# Patient Record
Sex: FEMALE | Race: WHITE | NOT HISPANIC OR LATINO | URBAN - METROPOLITAN AREA
[De-identification: names, ages, dates, MRNs, and addresses within clinical notes are randomized per-mention and may not be internally consistent; named-entity substitution may affect disease eponyms.]

---

## 2023-11-14 ENCOUNTER — IMAGING SERVICES (OUTPATIENT)
Dept: GENERAL RADIOLOGY | Age: 38
End: 2023-11-14
Attending: FAMILY MEDICINE

## 2023-11-14 ENCOUNTER — WALK IN (OUTPATIENT)
Dept: URGENT CARE | Age: 38
End: 2023-11-14

## 2023-11-14 VITALS
TEMPERATURE: 98 F | DIASTOLIC BLOOD PRESSURE: 72 MMHG | HEART RATE: 63 BPM | SYSTOLIC BLOOD PRESSURE: 104 MMHG | OXYGEN SATURATION: 99 % | RESPIRATION RATE: 16 BRPM

## 2023-11-14 DIAGNOSIS — R07.81 RIB PAIN ON RIGHT SIDE: ICD-10-CM

## 2023-11-14 DIAGNOSIS — R07.81 RIB PAIN ON RIGHT SIDE: Primary | ICD-10-CM

## 2023-11-14 PROCEDURE — 71101 X-RAY EXAM UNILAT RIBS/CHEST: CPT | Performed by: RADIOLOGY

## 2023-11-14 PROCEDURE — 99204 OFFICE O/P NEW MOD 45 MIN: CPT | Performed by: FAMILY MEDICINE

## 2023-11-14 RX ORDER — ACETAMINOPHEN AND CODEINE PHOSPHATE 300; 30 MG/1; MG/1
TABLET ORAL
Qty: 15 TABLET | Refills: 0 | Status: SHIPPED | OUTPATIENT
Start: 2023-11-14 | End: 2023-11-17

## 2024-04-21 ENCOUNTER — APPOINTMENT (OUTPATIENT)
Dept: GENERAL RADIOLOGY | Age: 39
End: 2024-04-21
Attending: NURSE PRACTITIONER
Payer: COMMERCIAL

## 2024-04-21 ENCOUNTER — HOSPITAL ENCOUNTER (OUTPATIENT)
Age: 39
Discharge: HOME | End: 2024-04-21
Payer: COMMERCIAL

## 2024-04-21 VITALS
HEART RATE: 76 BPM | RESPIRATION RATE: 18 BRPM | DIASTOLIC BLOOD PRESSURE: 84 MMHG | OXYGEN SATURATION: 100 % | TEMPERATURE: 97 F | SYSTOLIC BLOOD PRESSURE: 137 MMHG

## 2024-04-21 DIAGNOSIS — R07.89 CHEST WALL PAIN: ICD-10-CM

## 2024-04-21 DIAGNOSIS — R07.89 CHEST PAIN, ATYPICAL: ICD-10-CM

## 2024-04-21 DIAGNOSIS — M94.0 COSTOCHONDRITIS: Primary | ICD-10-CM

## 2024-04-21 PROCEDURE — 71046 X-RAY EXAM CHEST 2 VIEWS: CPT | Performed by: NURSE PRACTITIONER

## 2024-04-21 PROCEDURE — 99204 OFFICE O/P NEW MOD 45 MIN: CPT | Performed by: NURSE PRACTITIONER

## 2024-04-21 RX ORDER — NAPROXEN 500 MG/1
500 TABLET ORAL 2 TIMES DAILY PRN
Qty: 14 TABLET | Refills: 0 | Status: SHIPPED | OUTPATIENT
Start: 2024-04-21 | End: 2024-04-28

## 2024-04-21 NOTE — DISCHARGE INSTRUCTIONS
Increase water intake 2-3 liters daily   Performed 10 breaths while awake with incentive spirometer.  If you develop chest pain or shortness of breath please go to the emergency room.

## 2024-04-21 NOTE — ED PROVIDER NOTES
Patient Seen in: [facility]      History     Chief Complaint   Patient presents with    Pain    Chest Pain Angina     Stated Complaint: sharp pain when takes breath    Subjective:   HPI    39-year-old female presents today with complaints of anterior chest wall pain since Tuesday.  Patient states it started in the front and then yesterday has now traveled to the back.  Patient denies any fever or chills.  Patient states that the pain is reproducible with deep inspiration.    Objective:   History reviewed. No pertinent past medical history.           Past Surgical History:   Procedure Laterality Date     delivery only                  Social History     Socioeconomic History    Marital status:    Tobacco Use    Smoking status: Never    Smokeless tobacco: Never     Social Determinants of Health      Received from [facility]    Social Connections    Received from [facility]    Housing Stability              Review of Systems   Constitutional: Negative.    HENT: Negative.     Eyes: Negative.    Respiratory: Negative.     Cardiovascular:         Chest wall pain   Gastrointestinal: Negative.    Endocrine: Negative.    Genitourinary: Negative.    Musculoskeletal: Negative.    Skin: Negative.    Allergic/Immunologic: Negative.    Neurological: Negative.    Hematological: Negative.    Psychiatric/Behavioral: Negative.         Positive for stated complaint: sharp pain when takes breath  Other systems are as noted in HPI.  Constitutional and vital signs reviewed.      All other systems reviewed and negative except as noted above.    Physical Exam     ED Triage Vitals [24 1346]   /84   Pulse 76   Resp 18   Temp 97.3 °F (36.3 °C)   Temp src Temporal   SpO2 100 %   O2 Device None (Room air)       Current:/84   Pulse 76   Temp 97.3 °F (36.3 °C) (Temporal)   Resp 18   SpO2 100%         Physical Exam  Vitals and nursing note reviewed.    Constitutional:       Appearance: She is well-developed and normal weight.   HENT:      Head: Normocephalic.   Eyes:      Extraocular Movements: Extraocular movements intact.      Pupils: Pupils are equal, round, and reactive to light.   Cardiovascular:      Rate and Rhythm: Normal rate and regular rhythm.      Heart sounds: Normal heart sounds.   Pulmonary:      Effort: Pulmonary effort is normal.      Breath sounds: Normal breath sounds.      Comments: TTP over the anterior right lower chest wall. Pain reproduced with deep inspiration.   Chest:      Chest wall: Tenderness present.   Abdominal:      General: Bowel sounds are normal.      Palpations: Abdomen is soft.   Musculoskeletal:      Cervical back: Normal range of motion and neck supple.   Neurological:      Mental Status: She is alert.   Psychiatric:         Mood and Affect: Mood normal.             ED Course   Labs Reviewed - No data to display                   MDM      39-year-old female presents today with complaints of anterior chest wall pain since Tuesday.  Patient states it started in the front and then yesterday has now traveled to the back.  Patient denies any fever or chills.  Patient states that the pain is reproducible with deep inspiration.  VS: See EMR  Physical exam: See exam  Diff Diag: PNA, bronchitis, costochondritis.   XR CHEST PA + LAT CHEST (CPT=71046)    Result Date: 4/21/2024  CONCLUSION:  No acute process   LOCATION:  [id number]   Dictated by (CST): Ashish Cortes MD on 4/21/2024 at 2:01 PM     Finalized by (CST): Ashish Cortes MD on 4/21/2024 at 2:01 PM      Will diagnose with costochondritis.  Will prescribe naproxen.  Patient is to follow-up with primary care provider within 1 week.  ED precautions given.  Note to Patient  The 21st Century Cures Act makes medical notes like these available to patients in the interest of transparency. However, be advised this is a medical document and is intended as [id number] communication; it  is written in medical language and may appear blunt, direct, or contain abbreviations or verbiage that are unfamiliar. Medical documents are intended to carry relevant information, facts as evident, and the clinical opinion of the practitioner.     This report has been produced using speech recognition software, and may contain errors related to grammar, punctuation, spelling, words or phrases unrecognized or not translated appropriately to text; these errors may be referred to the dictating provider for further clarification and/or addendum as needed.                                     Medical Decision Making  39-year-old female presents today with complaints of anterior chest wall pain since Tuesday.  Patient states it started in the front and then yesterday has now traveled to the back.  Patient denies any fever or chills.  Patient states that the pain is reproducible with deep inspiration.    Problems Addressed:  Chest pain, atypical: acute illness or injury  Chest wall pain: acute illness or injury  Costochondritis: acute illness or injury    Amount and/or Complexity of Data Reviewed  Radiology: ordered. Decision-making details documented in ED Course.  ECG/medicine tests: ordered. Decision-making details documented in ED Course.    Risk  Prescription drug management.        Disposition and Plan     Clinical Impression:  1. Costochondritis    2. Chest pain, atypical    3. Chest wall pain         Disposition:  Discharge  4/21/2024  2:09 pm    Follow-up:  Calista Mora  [address] [address]  [phone]    In 1 week            Medications Prescribed:  Current Discharge Medication List        START taking these medications    Details   naproxen 500 MG Oral Tab Take 1 tablet (500 mg total) by mouth 2 (two) times daily as needed.  Qty: 14 tablet, Refills: 0

## 2024-04-21 NOTE — ED INITIAL ASSESSMENT (HPI)
Tues Pt started with sharp pain on the right side when taking deep breaths. 4/20 Pt started to radiate to the back, SOB    Denies: fevers, cough/congestion.  Pt reports hx of pneumonia 13 years ago.

## 2024-08-23 ENCOUNTER — LAB ENCOUNTER (OUTPATIENT)
Dept: LAB | Age: 39
End: 2024-08-23
Attending: NURSE PRACTITIONER
Payer: COMMERCIAL

## 2024-08-23 ENCOUNTER — OFFICE VISIT (OUTPATIENT)
Dept: FAMILY MEDICINE CLINIC | Facility: CLINIC | Age: 39
End: 2024-08-23
Payer: COMMERCIAL

## 2024-08-23 VITALS
WEIGHT: 192 LBS | HEIGHT: 61 IN | TEMPERATURE: 97 F | DIASTOLIC BLOOD PRESSURE: 82 MMHG | HEART RATE: 56 BPM | SYSTOLIC BLOOD PRESSURE: 123 MMHG | BODY MASS INDEX: 36.25 KG/M2 | RESPIRATION RATE: 18 BRPM | OXYGEN SATURATION: 99 %

## 2024-08-23 DIAGNOSIS — Z76.89 ENCOUNTER TO ESTABLISH CARE: Primary | ICD-10-CM

## 2024-08-23 DIAGNOSIS — Z23 NEED FOR VACCINATION: ICD-10-CM

## 2024-08-23 DIAGNOSIS — Z00.00 GENERAL MEDICAL EXAM: ICD-10-CM

## 2024-08-23 DIAGNOSIS — Z80.0 FAMILY HISTORY OF COLON CANCER: ICD-10-CM

## 2024-08-23 DIAGNOSIS — Z12.31 ENCOUNTER FOR SCREENING MAMMOGRAM FOR MALIGNANT NEOPLASM OF BREAST: ICD-10-CM

## 2024-08-23 DIAGNOSIS — Z80.8 FAMILY HISTORY OF MELANOMA: ICD-10-CM

## 2024-08-23 DIAGNOSIS — Z80.7 FAMILY HISTORY OF LYMPHOMA: ICD-10-CM

## 2024-08-23 DIAGNOSIS — Z12.4 CERVICAL CANCER SCREENING: ICD-10-CM

## 2024-08-23 LAB
ALBUMIN SERPL-MCNC: 4.7 G/DL (ref 3.2–4.8)
ALBUMIN/GLOB SERPL: 1.7 {RATIO} (ref 1–2)
ALP LIVER SERPL-CCNC: 96 U/L
ALT SERPL-CCNC: 12 U/L
ANION GAP SERPL CALC-SCNC: 1 MMOL/L (ref 0–18)
AST SERPL-CCNC: 15 U/L (ref ?–34)
BASOPHILS # BLD AUTO: 0.07 X10(3) UL (ref 0–0.2)
BASOPHILS NFR BLD AUTO: 0.8 %
BILIRUB SERPL-MCNC: 0.6 MG/DL (ref 0.3–1.2)
BUN BLD-MCNC: 13 MG/DL (ref 9–23)
CALCIUM BLD-MCNC: 9.6 MG/DL (ref 8.7–10.4)
CHLORIDE SERPL-SCNC: 109 MMOL/L (ref 98–112)
CHOLEST SERPL-MCNC: 180 MG/DL (ref ?–200)
CO2 SERPL-SCNC: 30 MMOL/L (ref 21–32)
CREAT BLD-MCNC: 0.72 MG/DL
EGFRCR SERPLBLD CKD-EPI 2021: 108 ML/MIN/1.73M2 (ref 60–?)
EOSINOPHIL # BLD AUTO: 0.07 X10(3) UL (ref 0–0.7)
EOSINOPHIL NFR BLD AUTO: 0.8 %
ERYTHROCYTE [DISTWIDTH] IN BLOOD BY AUTOMATED COUNT: 13.5 %
FASTING PATIENT LIPID ANSWER: YES
FASTING STATUS PATIENT QL REPORTED: YES
GLOBULIN PLAS-MCNC: 2.7 G/DL (ref 2–3.5)
GLUCOSE BLD-MCNC: 82 MG/DL (ref 70–99)
HCT VFR BLD AUTO: 39.9 %
HDLC SERPL-MCNC: 47 MG/DL (ref 40–59)
HGB BLD-MCNC: 12.6 G/DL
IMM GRANULOCYTES # BLD AUTO: 0.02 X10(3) UL (ref 0–1)
IMM GRANULOCYTES NFR BLD: 0.2 %
LDLC SERPL CALC-MCNC: 121 MG/DL (ref ?–100)
LYMPHOCYTES # BLD AUTO: 2.24 X10(3) UL (ref 1–4)
LYMPHOCYTES NFR BLD AUTO: 27.1 %
MCH RBC QN AUTO: 27.9 PG (ref 26–34)
MCHC RBC AUTO-ENTMCNC: 31.6 G/DL (ref 31–37)
MCV RBC AUTO: 88.3 FL
MONOCYTES # BLD AUTO: 0.48 X10(3) UL (ref 0.1–1)
MONOCYTES NFR BLD AUTO: 5.8 %
NEUTROPHILS # BLD AUTO: 5.4 X10 (3) UL (ref 1.5–7.7)
NEUTROPHILS # BLD AUTO: 5.4 X10(3) UL (ref 1.5–7.7)
NEUTROPHILS NFR BLD AUTO: 65.3 %
NONHDLC SERPL-MCNC: 133 MG/DL (ref ?–130)
OSMOLALITY SERPL CALC.SUM OF ELEC: 289 MOSM/KG (ref 275–295)
PLATELET # BLD AUTO: 335 10(3)UL (ref 150–450)
POTASSIUM SERPL-SCNC: 4.2 MMOL/L (ref 3.5–5.1)
PROT SERPL-MCNC: 7.4 G/DL (ref 5.7–8.2)
RBC # BLD AUTO: 4.52 X10(6)UL
SODIUM SERPL-SCNC: 140 MMOL/L (ref 136–145)
TRIGL SERPL-MCNC: 63 MG/DL (ref 30–149)
TSI SER-ACNC: 1.06 MIU/ML (ref 0.55–4.78)
VLDLC SERPL CALC-MCNC: 11 MG/DL (ref 0–30)
WBC # BLD AUTO: 8.3 X10(3) UL (ref 4–11)

## 2024-08-23 PROCEDURE — 80061 LIPID PANEL: CPT | Performed by: NURSE PRACTITIONER

## 2024-08-23 PROCEDURE — 80050 GENERAL HEALTH PANEL: CPT | Performed by: NURSE PRACTITIONER

## 2024-08-23 NOTE — PROGRESS NOTES
HPI:   Patient is here for physical.    LMP: 24  Menstrual Cycle Length: regular  PMS: cramps  Contraception: none    Last Pap:   Last Mammogram: never  Last Colon Cancer Screen: never    Exercise: Peloton   Diet: balanced     Wt Readings from Last 6 Encounters:   24 192 lb (87.1 kg)     Body mass index is 36.28 kg/m².     No results found for: \"CHOLEST\", \"HDL\", \"LDL\", \"TRIGLY\", \"AST\", \"ALT\"     No current outpatient medications on file.      History reviewed. No pertinent past medical history.   Past Surgical History:   Procedure Laterality Date           delivery only      [place] teeth removed        Family History   Problem Relation Age of Onset    Hypertension Father     Other (Lymphoma) Father     Prostate Cancer Father     Melanoma Father     Colon Cancer Mother     No Known Problems Son     No Known Problems Son     Breast Cancer Maternal Grandmother     Other (Lung Cancer) Maternal Grandmother     Cancer Paternal Grandmother     Depression Paternal Grandmother     No Known Problems Sister     No Known Problems Sister       Social History:   Social History     Socioeconomic History    Marital status:    Tobacco Use    Smoking status: Never     Passive exposure: Never    Smokeless tobacco: Never   Vaping Use    Vaping status: Never Used   Substance and Sexual Activity    Alcohol use: Yes     Comment: occasionally    Drug use: Never     Social Determinants of Health      Received from [facility]    Social Connections    Received from [facility]    Housing Stability        REVIEW OF SYSTEMS:   Review of Systems   Constitutional:  Negative for chills, fatigue, fever and unexpected weight change.   HENT:  Negative for congestion, ear pain, postnasal drip, rhinorrhea, sore throat and trouble swallowing.    Eyes:  Negative for visual disturbance.   Respiratory:  Negative for cough and shortness of breath.    Cardiovascular:  Negative for  chest pain and palpitations.   Gastrointestinal:  Negative for abdominal pain, blood in stool, constipation, diarrhea, nausea and vomiting.   Endocrine: Negative for cold intolerance, heat intolerance, polydipsia, polyphagia and polyuria.   Genitourinary:  Negative for dysuria, frequency, hematuria and pelvic pain.   Musculoskeletal:  Negative for back pain.   Skin:  Negative for rash.   Neurological:  Negative for headaches.   Hematological:  Does not bruise/bleed easily.   Psychiatric/Behavioral:  Negative for dysphoric mood. The patient is not nervous/anxious.        EXAM:   /82 (BP Location: Left arm, Patient Position: Sitting, Cuff Size: adult)   Pulse 56   Temp 97.3 °F (36.3 °C)   Resp 18   Ht 5' 1\" (1.549 m)   Wt 192 lb (87.1 kg)   LMP 08/19/2024 (Exact Date)   SpO2 99%   BMI 36.28 kg/m²   Ideal body weight: 47.8 kg (105 lb 6.1 oz)  Adjusted ideal body weight: 63.5 kg (140 lb 0.5 oz)   Physical Exam  Constitutional:       General: She is not in acute distress.     Appearance: Normal appearance. She is well-developed, well-groomed and overweight. She is not ill-appearing.   HENT:      Right Ear: Tympanic membrane, ear canal and external ear normal.      Left Ear: Tympanic membrane, ear canal and external ear normal.      Nose: Nose normal.      Mouth/Throat:      Mouth: Mucous membranes are moist.      Pharynx: Oropharynx is clear.   Eyes:      Conjunctiva/sclera: Conjunctivae normal.      Pupils: Pupils are equal, round, and reactive to light.   Neck:      Thyroid: No thyromegaly.   Cardiovascular:      Rate and Rhythm: Normal rate and regular rhythm.      Heart sounds: Normal heart sounds.   Pulmonary:      Effort: Pulmonary effort is normal.      Breath sounds: Normal breath sounds.   Abdominal:      General: Abdomen is flat. Bowel sounds are normal.      Palpations: Abdomen is soft.      Tenderness: There is no abdominal tenderness.   Musculoskeletal:         General: Normal range of motion.       Cervical back: Normal range of motion.   Skin:     General: Skin is warm and dry.   Neurological:      General: No focal deficit present.      Mental Status: She is alert and oriented to person, place, and time.      Cranial Nerves: No cranial nerve deficit.   Psychiatric:         Mood and Affect: Mood normal.         ASSESSMENT AND PLAN:   Diagnoses and all orders for this visit:    Encounter to establish care    General medical exam  -     TSH W Reflex To Free T4; Future  -     Comp Metabolic Panel (14); Future  -     Lipid Panel; Future  -     CBC With Differential With Platelet; Future    Need for vaccination  -     TdaP (Adacel, Boostrix) [43756]    Cervical cancer screening  -     OBG Referral - [facility] ([place])    Family history of colon cancer  -     Gastro Referral - In Network  -     Genetic Counselor Referral - [facility] ([place])    Family history of melanoma  -     DERM - EXTERNAL  -     Genetic Counselor Referral - [facility] ([place])    Family history of lymphoma  -     Genetic Counselor Referral - [facility])    Encounter for screening mammogram for malignant neoplasm of breast  -     [facility] CLYDE 2D+3D SCREENING BILAT (CPT=77067/23626); Future    Fasting lab work today  Recommend discussing with GI regarding when to start colonoscopy  Will start annual mammograms  Schedule with gyne to get pap smear  Recommend skin check with derm

## 2024-08-26 ENCOUNTER — TELEPHONE (OUTPATIENT)
Dept: FAMILY MEDICINE CLINIC | Facility: CLINIC | Age: 39
End: 2024-08-26

## 2024-08-26 NOTE — TELEPHONE ENCOUNTER
----- Message from Arabella Thompson sent at 8/26/2024  9:35 AM CDT -----  Results reviewed.   LDL mildly elevated but overall cholesterol is in acceptable range. Would on heart healthy diet  No anemia  Thyroid function normal  Chemistries normal

## 2024-09-30 ENCOUNTER — OFFICE VISIT (OUTPATIENT)
Dept: OBGYN CLINIC | Facility: CLINIC | Age: 39
End: 2024-09-30
Payer: COMMERCIAL

## 2024-09-30 ENCOUNTER — HOSPITAL ENCOUNTER (OUTPATIENT)
Dept: MAMMOGRAPHY | Facility: HOSPITAL | Age: 39
Discharge: HOME | End: 2024-09-30
Attending: NURSE PRACTITIONER
Payer: COMMERCIAL

## 2024-09-30 VITALS
DIASTOLIC BLOOD PRESSURE: 80 MMHG | WEIGHT: 195.38 LBS | SYSTOLIC BLOOD PRESSURE: 118 MMHG | BODY MASS INDEX: 37 KG/M2 | HEART RATE: 80 BPM

## 2024-09-30 DIAGNOSIS — Z12.4 CERVICAL CANCER SCREENING: ICD-10-CM

## 2024-09-30 DIAGNOSIS — Z12.31 ENCOUNTER FOR SCREENING MAMMOGRAM FOR MALIGNANT NEOPLASM OF BREAST: ICD-10-CM

## 2024-09-30 DIAGNOSIS — Z01.419 WELL WOMAN EXAM WITH ROUTINE GYNECOLOGICAL EXAM: Primary | ICD-10-CM

## 2024-09-30 PROCEDURE — 88175 CYTOPATH C/V AUTO FLUID REDO: CPT | Performed by: NURSE PRACTITIONER

## 2024-09-30 PROCEDURE — 3079F DIAST BP 80-89 MM HG: CPT | Performed by: NURSE PRACTITIONER

## 2024-09-30 PROCEDURE — 77067 SCR MAMMO BI INCL CAD: CPT | Performed by: NURSE PRACTITIONER

## 2024-09-30 PROCEDURE — 87624 HPV HI-RISK TYP POOLED RSLT: CPT | Performed by: NURSE PRACTITIONER

## 2024-09-30 PROCEDURE — 3074F SYST BP LT 130 MM HG: CPT | Performed by: NURSE PRACTITIONER

## 2024-09-30 PROCEDURE — 99396 PREV VISIT EST AGE 40-64: CPT | Performed by: NURSE PRACTITIONER

## 2024-09-30 PROCEDURE — 77063 BREAST TOMOSYNTHESIS BI: CPT | Performed by: NURSE PRACTITIONER

## 2024-09-30 NOTE — PROGRESS NOTES
Here for new gynecology visit.  39 year old G 2 P 2.  Patient's last menstrual period was 2024 (exact date)..     Here for Annual Gynecologic Exam.     Menses Q 28-30 days for 6 days.  Nothing for contraception.    Last pap smear was in  and it was normal.  No hx of abnormal pap smears.     OB Hx:  2 C/S.    Family gyn hx:  neg.   Family breast hx:  grandmother.  Last mammogram in today.  Screening labs with PCP.    History reviewed. No pertinent past medical history.    Past Surgical History:   Procedure Laterality Date           delivery only      [place] teeth removed       No current outpatient medications on file prior to visit.     No current facility-administered medications on file prior to visit.     OB History    Para Term  AB Living   2 2 2 0 0 2   SAB IAB Ectopic Multiple Live Births   0 0 0 0 2      # Outcome Date GA Lbr Rui/2nd Weight Sex Type Anes PTL Lv   2 Term 13 39w0d  8 lb 10 oz (3.912 kg) M CS-Unspec   REE   1 Term 07/12/10 41w0d  9 lb 10 oz (4.366 kg) M CS-Unspec   REE      Complications: Failure to Progress in Second Stage     ROS:    General:  No wt loss, wt gain, appetite changes.    /80   Pulse 80   Wt 195 lb 6.4 oz (88.6 kg)   LMP 2024 (Exact Date)   BMI 36.92 kg/m²     NECK:  Thyroid normal size without nodules. No adenopathy.  LUNGS:  Clear to auscultation.  COR;  Regular rate and rhythm.    BREASTS:  symmetrical in shape. No masses, tenderness, secretions, or adenopathy.  ABDOMEN:  Soft and non tender.  No organomegaly.  No inguinal adenopathy.  VULVA:  No lesions or erythema.  VAGINA:  No lesions, scant discharge.  CERVIX:  No lesions or CMT.  Pap smear done with HPV.  UTERUS:  anteflexed and normal size.  ADNEXA:  No pain or masses.    IMP/PLAN:    1. Well woman exam with routine gynecological exam  Regular self breast exams recommended    2. Cervical cancer screening  - ThinPrep PAP with HPV Reflex Request; Future    See  in 1 year/ prn.

## 2024-10-03 LAB
.: NORMAL
.: NORMAL
HPV E6+E7 MRNA CVX QL NAA+PROBE: NEGATIVE

## 2024-10-08 ENCOUNTER — HOSPITAL ENCOUNTER (OUTPATIENT)
Dept: MAMMOGRAPHY | Facility: HOSPITAL | Age: 39
Discharge: HOME | End: 2024-10-08
Attending: NURSE PRACTITIONER
Payer: COMMERCIAL

## 2024-10-08 DIAGNOSIS — R92.2 INCONCLUSIVE MAMMOGRAM: ICD-10-CM

## 2024-10-08 PROCEDURE — 76642 ULTRASOUND BREAST LIMITED: CPT | Performed by: NURSE PRACTITIONER

## 2024-10-08 PROCEDURE — 77062 BREAST TOMOSYNTHESIS BI: CPT | Performed by: NURSE PRACTITIONER

## 2024-10-08 PROCEDURE — 77066 DX MAMMO INCL CAD BI: CPT | Performed by: NURSE PRACTITIONER

## 2024-10-09 ENCOUNTER — TELEPHONE (OUTPATIENT)
Dept: FAMILY MEDICINE CLINIC | Facility: CLINIC | Age: 39
End: 2024-10-09

## 2024-10-09 NOTE — TELEPHONE ENCOUNTER
----- Message from Arabella Thompson sent at 10/9/2024  8:43 AM CDT -----  Results reviewed.   Diagnostic mammogram appears benign  Screening mammogram recommended in 1 year

## 2025-01-14 ENCOUNTER — TELEMEDICINE (OUTPATIENT)
Facility: CLINIC | Age: 40
End: 2025-01-14
Payer: COMMERCIAL

## 2025-01-14 DIAGNOSIS — Z51.81 ENCOUNTER FOR THERAPEUTIC DRUG LEVEL MONITORING: Primary | ICD-10-CM

## 2025-01-14 DIAGNOSIS — E78.5 HYPERLIPIDEMIA, UNSPECIFIED HYPERLIPIDEMIA TYPE: ICD-10-CM

## 2025-01-14 DIAGNOSIS — R63.5 WEIGHT GAIN: ICD-10-CM

## 2025-01-14 DIAGNOSIS — E66.812 CLASS 2 SEVERE OBESITY WITH SERIOUS COMORBIDITY AND BODY MASS INDEX (BMI) OF 36.0 TO 36.9 IN ADULT, UNSPECIFIED OBESITY TYPE (HCC): ICD-10-CM

## 2025-01-14 DIAGNOSIS — E66.01 CLASS 2 SEVERE OBESITY WITH SERIOUS COMORBIDITY AND BODY MASS INDEX (BMI) OF 36.0 TO 36.9 IN ADULT, UNSPECIFIED OBESITY TYPE (HCC): ICD-10-CM

## 2025-01-14 DIAGNOSIS — E55.9 VITAMIN D DEFICIENCY: ICD-10-CM

## 2025-01-14 PROCEDURE — 98002 SYNCH AUDIO-VIDEO NEW MOD 45: CPT | Performed by: PHYSICIAN ASSISTANT

## 2025-01-14 RX ORDER — TIRZEPATIDE 2.5 MG/.5ML
2.5 INJECTION, SOLUTION SUBCUTANEOUS WEEKLY
Qty: 2 ML | Refills: 0 | Status: SHIPPED | OUTPATIENT
Start: 2025-01-14

## 2025-01-14 NOTE — PATIENT INSTRUCTIONS
1300 calories a day    Moderate Carb (30/35/35)  98g  Protein    51g  Fats    114g  Carbs    Lower Carb (40/40/20)  130g  Protein    58g  Fats    65g  Carbs    We are here to support you with weight loss, but please remember that you still need your primary care provider for your routine health maintenance.      PLAN:    Follow up with me in 4 months  Schedule follow up appointments: Francisco Kowng (dietitian) or Rowena Sagastume (presurgery dietitian)   Check for insurance coverage for dietitian and labwork prior to scheduling appointment.     Please try to work on the following dietary changes:  Goals: Aim for 20-30 grams of protein/ meal  Eat 4-6 vegetables/day  Avoid skipping meals- eat every 4-5 hours  Aim for 3 meals/day  2. Drink lots of water and cut down on soda/juice consumption if soda/juice drinker  3. Focus on protein: (15-30 grams with each meal) ie. greek yogurt, cottage cheese, string cheese, hard boiled eggs  4. Healthy snacks: peanut butter and apples, hummus and carrots, berries, nuts (1/4 cup), tuna and crackers                 Protein Shakes: Premier protein or Core Power                Protein Bars: Rx Bars, Oatmega, Power Crunch                 [place] balanced breaks (cheese and nuts)- without chocolate  5. Reduce carbohydrates which includes sweets as well as rice, pasta, potatoes, bread, corn and instead choose whole grain options or more protein or vegetables (4-6 servings of vegetables per day)  6. Get a good night of sleep  7. Try to decrease stress in life     Please download apps:  1. My Fitness Pal, Lose it, My Net Diary sonny to help you to monitor daily dietary intake and you will be able to see if you are eating the right amount of calories, protein, carbs                With My Fitness Pal-->When you set-up the sonny or need to adjust settings:                Goals should include:                 Lose 1.5-2 lbs per week                Activity level: not very active (can't count exercise  towards calorie number per day)                   ** Daily INPUT> Look at nutrition section-- \"nutrients\" and it will break down your macros for the day (ie. Protein, carbs, fibers, sugars and fats). Try to stay within these numbers daily     2. \"7 minute workout\" to help with exercise/activity which takes 7 minutes of your day and that you can do at home!   3. \"Calm\" or \"Headspace\" which helps with mindfulness, meditation, clarity, sleep, and cisco to your daily life.   4. [employer] blog for healthy recipe ideas  5. [employer] for low carb resources    HIGH PROTEIN SNACK IDEAS  -cottage cheese  -plain yogurt  -kefir  -hard-boiled eggs  -natural cheeses  -nuts (measure portion size)   -unsweetened nut butters  -dried edamame   -tucker seeds soaked in water or almond milk  -soy nuts  -cured meats (monitor for sodium issues)   -hummus with vegetables  -bean dip with vegetables     FRUIT  Low carb fruit options   Raspberries: Half a cup (60 grams) contains 3 grams of carbs.  Blackberries: Half a cup (70 grams) contains 4 grams of carbs.  Strawberries: Half a cup (100 grams) contains 6 grams of carbs.  Blueberries: Half a cup (50 grams) contains 6 grams of carbs.  Plum: One medium-sized (80 grams) contains 6 grams of carbs.     VEGETABLES  Low carb vegetables              Delicious and Healthy Snacks     Tomato and cheese plate--mix 1/2 cup   cherry tomatoes, 1 cup fresh mini mozzarella   balls, ½ cup olives     Cottage cheese & berries--top ½ cup of   cottage cheese with 1 cup of berries of   choice. Add one handful of pecans for some   extra protein, fat and fiber.      Apple + Greek Yogurt + Nut butter*--cut up   an apple and dip in 1/3 cup of Greek yogurt   and 2 tbsp of nut butter.     Hummus & veggies--dip baby carrots, pepper   strips, or snap peas in ¼ cup hummus.     Nuts--munch on 1oz of any kind of nut (about   ¼ cup). In the shell will help to slow down!   Carrots and cheese plate-- 1 cup of baby   carrots  (or veg of choice) with ½ cup cheddar   cheese cubes and 2-4 low sodium, nitrate   free turkey slices     Yogurt & berries--mix ½-1 cup berries in a   6oz container of plain or low sugar fruit   flavored 2% Greek yogurt (less than15 grams   of sugar per serving). Chobani (Less Sugar) or   Siggis are examples.     Hard boiled egg & fruit--1 to 2 hardboiled   eggs and a tangerine or other small serving   of fruit     Tuna & avocado--put 2oz of tuna (one pouch)   on 1/3 of an avocado or 2 tablespoons   guacamole and top with salsa     String cheese & veggies--one piece cheese   (¾ oz) and 1 cup snap peas or other   vegetables     Cauliflower rice & cheese--sprinkle ¼ cup   shredded cheese on 1 cup cauliflower rice   and microwave until cheese melts     Deviled eggs--3 deviled egg halves     Bean dip & veggies- ½ cup refried beans   with ¼ cup shredded cheese melted on   top. Use as a dip for celery or red pepper   strips or eat plain.     Sargento Balanced Breaks-or make your   own with ½ oz nuts, ½ oz cheese, and 1   teaspoon dried fruit.     Edamame with fruit and nuts--1-2   mandarin orange, ¼ cup cashews, ¼ cup   edamame      Low fat chicken, egg, or tuna salad--mix   2oz chicken, tuna, or with 1 teaspoon   mayonnaise, 1 teaspoon Russ mustard,   and 1 teaspoon plain yogurt. Add chopped   celery, onions, walnuts, Granny Smith   apples, or dried cranberries for extra flavor.     [place] break--turkey, chicken, or nut   butter on 1 slice whole grain bread.     Protein Shake--Dickens, Core Power, Orgain,   Premier Protein, Fairlife     Protein Bar--Quest, Oatmega, RX Bar, Milner   Bars     Protein Chips - Legendary and Quest chips  These snacks contain protein,   fiber and fat to keep you full and   energized!   *If you have a peanut allergy, you can substitute with   Sun Butter (made from sunflower seeds) or WOW butter   (made from soy

## 2025-01-14 NOTE — PROGRESS NOTES
[facility] WEIGHT MANAGEMENT VIRTUAL VIDEO ENCOUNTER     Tisha Webster verbally consents to a Virtual/Telephone Check-In service on 01/14/25  Patient understands and accepts financial responsibility for any deductible, co-insurance and/or co-pays associated with this service.    HISTORY OF PRESENT ILLNESS  Chief Complaint   Patient presents with    Weight Problem     Tisha Webster is a 39 year old female new patient, is being evaluated as a video visit using Telemedicine with live, interactive video and audio. Tisha Webster for initiation of medical weight loss program.  Patient presents today with c/o excess weight. Referred by     Started to gain weight about 7yrs ago - stress was higher, parents were living with her and both diagnosed with cancer  Was active at the time, training for [employer]  Gone through different spurts of logging foods, exercise    Generally does not eat meat    Often doesn't feel hungry and doesn't feel full    Denies chest pain, shortness of breath, dizziness, blurred vision, headache, paresthesia, nausea/vomiting.     Reason/goal for weight loss: 30/60lbs    Reviewed [facility] patient contract: Readiness for Lifestyle change: 10/10, Interest in Medication: 8/10, Bariatric surgery interest: 0/10    Weight  Starting weight: 195lbs, 5'2\"      Wt Readings from Last 6 Encounters:   09/30/24 195 lb 6.4 oz (88.6 kg)   08/23/24 192 lb (87.1 kg)        Typical diet   Breakfast Lunch Dinner Snacks Fluids   Smoothie (1 c berries, 1 banana, 1 scoop vanilla proteon powder) 7:30 am  1 bowl Homemade chili 12:30 pm      3 slices pizza at 7pm  Doesn't keep snacks around  Salty/crunchy Water (40 oz), tea (8 oz), la croix (1 can)          Social hx and lifestyle reviewed:    Work: has a long commute, works at [employer]  Marital status: yes   Support: yes  Tobacco use: none  ETOH use:   1 per/week  Supplements: none  Exercise:   Stress level: 6/10  Sleep hours and integrity:   Hours per night    MEDICAL HISTORY  PMH reviewed:   Cardiac disorders:negative   Depression/anxiety: negative  Glaucoma: negative  Kidney stones: negative  Eating disorder: negative  Migraines/seizures: negative  Joint-related conditions:negative  Liver disease: negative  Thyroid disease: negative  Constipation: negative  Diabetes: negative  Sleep Apnea hx: n/a   Cancer hx: negative  DVT: negative  Family or personal history of Pancreatic issues / Medullary Thyroid Cancer: negative  History of bariatric surgery: negative    FMH reviewed: obesity in parent/s or sibling:     REVIEW OF SYSTEMS  GENERAL: feels well otherwise, and negative fatigue   SKIN: denies any rashes to skin folds   HEENT: denies neck thickening  LUNGS: denies shortness of breath with exertion, no apnea  CARDIOVASCULAR: denies chest pain on exertion, denies palpitations or pedal edema  GI: denies abdominal pain, distention, No N/V/D/C  MUSCULOSKELETAL: denies back pain, joint pains   NEURO: denies headaches or dizziness  ENDOCRINE: denies any excess hunger, urination or thirst, denies any purple striae  PSYCH: denies change in behavior or mood, denies feeling sad or depressed    EXAM  LMP 09/14/2024 (Exact Date) , Percent body fat: unable to complete (will perform in office)   Reviewed recent set of vitals       GENERAL: well developed, well nourished, in no apparent distress, speaking in full sentences comfortably   SKIN: warm, pink, dry without rashes to exposed area   EYES: conjunctiva pink  HEENT: atraumatic, normocephalic  LUNGS: normal work of breathing, non labored  CARDIO: normal work, no exertion  EXTREMITIES: no cyanosis, no clubbing, no edema  NEURO: Oriented times three  PSYCH: pleasant, cooperative, normal mood and affect    Lab Results   Component Value Date    GLU 82 08/23/2024    BUN 13 08/23/2024    CREATSERUM 0.72 08/23/2024    ANIONGAP 1 08/23/2024    CA 9.6 08/23/2024    OSMOCALC 289 08/23/2024    ALKPHO 96 08/23/2024    AST 15  08/23/2024    ALT 12 08/23/2024    BILT 0.6 08/23/2024    TP 7.4 08/23/2024    ALB 4.7 08/23/2024    GLOBULIN 2.7 08/23/2024     08/23/2024    K 4.2 08/23/2024     08/23/2024    CO2 30.0 08/23/2024     No results found for: \"EAG\", \"A1C\"  Lab Results   Component Value Date    CHOLEST 180 08/23/2024    TRIG 63 08/23/2024    HDL 47 08/23/2024     (H) 08/23/2024    VLDL 11 08/23/2024    NONHDLC 133 (H) 08/23/2024     No results found for: \"B12\", \"VITB12\"  No results found for: \"VITD\", \"QVITD\", \"DQQD26HP\"    Medications Ordered Prior to Encounter[1]    ASSESSMENT/PLAN    ICD-10-CM    1. Encounter for therapeutic drug level monitoring  Z51.81 [facility] Dietician Referral  ([facility] USE ONLY)     Tirzepatide-Weight Management (ZEPBOUND) 2.5 MG/0.5ML Subcutaneous Solution Auto-injector      2. Class 2 severe obesity with serious comorbidity and body mass index (BMI) of 36.0 to 36.9 in adult, unspecified obesity type (HCC)  E66.812 [facility] Dietician Referral  ([facility] USE ONLY)    Z68.36 Tirzepatide-Weight Management (ZEPBOUND) 2.5 MG/0.5ML Subcutaneous Solution Auto-injector    E66.01       3. Hyperlipidemia, unspecified hyperlipidemia type  E78.5 [facility] Dietician Referral  ([facility] USE ONLY)     Tirzepatide-Weight Management (ZEPBOUND) 2.5 MG/0.5ML Subcutaneous Solution Auto-injector      4. Vitamin D deficiency  E55.9 [facility] Dietician Referral  ([facility] USE ONLY)     Tirzepatide-Weight Management (ZEPBOUND) 2.5 MG/0.5ML Subcutaneous Solution Auto-injector      5. Weight gain  R63.5 [facility] Dietician Referral  ([facility] USE ONLY)     Tirzepatide-Weight Management (ZEPBOUND) 2.5 MG/0.5ML Subcutaneous Solution Auto-injector        Initial Weight Data and Goal Weight Loss:     Initial consult:  lbs on /2021, Down  Lb:  lbs total     PLAN   Initial Weight: 195lbs  Initial Weight Date: 1/14/25  Today's Weight: 195lbs  5% Goal: 9.75lbs   10% Goal: 19.5lbs  Total Weight Loss:     Baseline labs reviewed  Will start medications: Will begin Zepbound 2.5mg  weekly - denies any personal or family history of pancreatitis, pancreatic cancer, thyroid cancer, MEN2 - discussed MOA, advised side effects and adverse effects of medication.  --advised of side effects and adverse effects of this medication  Contradictions: EKG prior to stimulant  Body composition will be done in the office   HLD - lifestyle changes  Vit D supplement, take with food  Begin logging foods - discussed macronutrient goals  -low carb high protein  -portion control  -Limit carbohydrates  -Eat breakfast every day   -Don't skip meals   -Read nutrition labels and keep a food log  -drink a lot of water 65 oz of water per day  - Do not drink your calories (no soda, juice, high calorie coffee drinks, limit alcohol)  - Do not eat late at night  Decrease carbs, increase protein, no skipping meals   Needs to incorporate exercise regimen FITTE: ACSM recommendations (150-300 minutes/ week in active weight loss)   Follow up with dietitian and psychologist as recommended.  Discussed the role of sleep and stress in weight management.  Counseled on comprehensive weight loss plan including attention to nutrition, exercise and behavior/stress management for success. See patient instruction below for more details.    Total time spent on chart review, pre-charting, obtaining history, counseling, and educating, reviewing labs was 45 minutes.       Patient Instructions   1300 calories a day    Moderate Carb (30/35/35)  98g  Protein    51g  Fats    114g  Carbs    Lower Carb (40/40/20)  130g  Protein    58g  Fats    65g  Carbs    We are here to support you with weight loss, but please remember that you still need your primary care provider for your routine health maintenance.      PLAN:    Follow up with me in 4 months  Schedule follow up appointments: Francisco Kwong (dietitian) or Rowena Sagastume (presurgery dietitian)   Check for insurance coverage for dietitian and labwork prior to scheduling appointment.     Please try to work  on the following dietary changes:  Goals: Aim for 20-30 grams of protein/ meal  Eat 4-6 vegetables/day  Avoid skipping meals- eat every 4-5 hours  Aim for 3 meals/day  2. Drink lots of water and cut down on soda/juice consumption if soda/juice drinker  3. Focus on protein: (15-30 grams with each meal) ie. greek yogurt, cottage cheese, string cheese, hard boiled eggs  4. Healthy snacks: peanut butter and apples, hummus and carrots, berries, nuts (1/4 cup), tuna and crackers                 Protein Shakes: Premier protein or Core Power                Protein Bars: Rx Bars, Oatmega, Power Crunch                 Sargento balanced breaks (cheese and nuts)- without chocolate  5. Reduce carbohydrates which includes sweets as well as rice, pasta, potatoes, bread, corn and instead choose whole grain options or more protein or vegetables (4-6 servings of vegetables per day)  6. Get a good night of sleep  7. Try to decrease stress in life     Please download apps:  1. My Fitness Pal, Lose it, My Net Diary sonny to help you to monitor daily dietary intake and you will be able to see if you are eating the right amount of calories, protein, carbs                With My Fitness Pal-->When you set-up the sonny or need to adjust settings:                Goals should include:                 Lose 1.5-2 lbs per week                Activity level: not very active (can't count exercise towards calorie number per day)                   ** Daily INPUT> Look at nutrition section-- \"nutrients\" and it will break down your macros for the day (ie. Protein, carbs, fibers, sugars and fats). Try to stay within these numbers daily     2. \"7 minute workout\" to help with exercise/activity which takes 7 minutes of your day and that you can do at home!   3. \"Calm\" or \"Headspace\" which helps with mindfulness, meditation, clarity, sleep, and cisco to your daily life.   4. Skinnytaste blog for healthy recipe ideas  5. Nav[employer]ell for low carb  resources    HIGH PROTEIN SNACK IDEAS  -cottage cheese  -plain yogurt  -kefir  -hard-boiled eggs  -natural cheeses  -nuts (measure portion size)   -unsweetened nut butters  -dried edamame   -tucker seeds soaked in water or almond milk  -soy nuts  -cured meats (monitor for sodium issues)   -hummus with vegetables  -bean dip with vegetables     FRUIT  Low carb fruit options   Raspberries: Half a cup (60 grams) contains 3 grams of carbs.  Blackberries: Half a cup (70 grams) contains 4 grams of carbs.  Strawberries: Half a cup (100 grams) contains 6 grams of carbs.  Blueberries: Half a cup (50 grams) contains 6 grams of carbs.  Plum: One medium-sized (80 grams) contains 6 grams of carbs.     VEGETABLES  Low carb vegetables              Delicious and Healthy Snacks     Tomato and cheese plate--mix 1/2 cup   cherry tomatoes, 1 cup fresh mini mozzarella   balls, ½ cup olives     Cottage cheese & berries--top ½ cup of   cottage cheese with 1 cup of berries of   choice. Add one handful of pecans for some   extra protein, fat and fiber.      Apple + Greek Yogurt + Nut butter*--cut up   an apple and dip in 1/3 cup of Greek yogurt   and 2 tbsp of nut butter.     Hummus & veggies--dip baby carrots, pepper   strips, or snap peas in ¼ cup hummus.     Nuts--munch on 1oz of any kind of nut (about   ¼ cup). In the shell will help to slow down!   Carrots and cheese plate-- 1 cup of baby   carrots (or veg of choice) with ½ cup cheddar   cheese cubes and 2-4 low sodium, nitrate   free turkey slices     Yogurt & berries--mix ½-1 cup berries in a   6oz container of plain or low sugar fruit   flavored 2% Greek yogurt (less than15 grams   of sugar per serving). Chobani (Less Sugar) or   Siggis are examples.     Hard boiled egg & fruit--1 to 2 hardboiled   eggs and a tangerine or other small serving   of fruit     Tuna & avocado--put 2oz of tuna (one pouch)   on 1/3 of an avocado or 2 tablespoons   guacamole and top with salsa     String  cheese & veggies--one piece cheese   (¾ oz) and 1 cup snap peas or other   vegetables     Cauliflower rice & cheese--sprinkle ¼ cup   shredded cheese on 1 cup cauliflower rice   and microwave until cheese melts     Deviled eggs--3 deviled egg halves     Bean dip & veggies- ½ cup refried beans   with ¼ cup shredded cheese melted on   top. Use as a dip for celery or red pepper   strips or eat plain.     Sargento Balanced Breaks-or make your   own with ½ oz nuts, ½ oz cheese, and 1   teaspoon dried fruit.     Edamame with fruit and nuts--1-2   mandarin orange, ¼ cup cashews, ¼ cup   edamame      Low fat chicken, egg, or tuna salad--mix   2oz chicken, tuna, or with 1 teaspoon   mayonnaise, 1 teaspoon Russ mustard,   and 1 teaspoon plain yogurt. Add chopped   celery, onions, walnuts, Granny Smith   apples, or dried cranberries for extra flavor.     Leeds break--turkey, chicken, or nut   butter on 1 slice whole grain bread.     Protein Shake--Dickens, Core Power, Orgain,   Premier Protein, Fairlife     Protein Bar--Quest, Oatmega, RX Bar, Hiko   Bars     Protein Chips - Legendary and Quest chips  These snacks contain protein,   fiber and fat to keep you full and   energized!   *If you have a peanut allergy, you can substitute with   Sun Butter (made from sunflower seeds) or WOW butter   (made from soy      No follow-ups on file.    Patient verbalizes understanding.    Pt understands phone/video evaluation is not a substitute for face to face examination or emergency care. Pt advised to go to the ER or call 911 for worsening symptoms or acute distress.       Please note that the following visit was completed using two-way, real-time interactive audio and/or video communication.  This has been done in good tor to provide continuity of care in the best interest of the provider-patient relationship, due to the ongoing public health crisis/national emergency and because of restrictions of visitation.  There are limitations  of this visit as no physical exam could be performed.  Every conscious effort was taken to allow for sufficient and adequate time.  This billing was spent on reviewing labs, medications, radiology tests and decision making.  Appropriate medical decision-making and tests are ordered as detailed in the plan of care above.     Dionne Miller PA-C         [1]   No current outpatient medications on file prior to visit.     No current facility-administered medications on file prior to visit.

## 2025-01-16 ENCOUNTER — PATIENT MESSAGE (OUTPATIENT)
Facility: CLINIC | Age: 40
End: 2025-01-16

## 2025-01-16 NOTE — TELEPHONE ENCOUNTER
Approved    Prior authorization approved  Payer: [employer] [facility] Case ID: [id number]    [phone]  Note from payer: Your request was approved based on the initial information provided at the time of the coverage request submission. Please allow additional time for the final decision to be made and added to the patient's account.  Electronic appeal: Not supported

## 2025-02-03 ENCOUNTER — PATIENT MESSAGE (OUTPATIENT)
Facility: CLINIC | Age: 40
End: 2025-02-03

## 2025-02-03 DIAGNOSIS — E66.812 CLASS 2 SEVERE OBESITY WITH SERIOUS COMORBIDITY AND BODY MASS INDEX (BMI) OF 36.0 TO 36.9 IN ADULT, UNSPECIFIED OBESITY TYPE (HCC): Primary | ICD-10-CM

## 2025-02-03 DIAGNOSIS — E66.01 CLASS 2 SEVERE OBESITY WITH SERIOUS COMORBIDITY AND BODY MASS INDEX (BMI) OF 36.0 TO 36.9 IN ADULT, UNSPECIFIED OBESITY TYPE (HCC): Primary | ICD-10-CM

## 2025-02-04 NOTE — TELEPHONE ENCOUNTER
Requesting zepbound increase  LOV: 1/14/25 video  RTC: 4 months  Last Relevant Labs: na  Filled: 1/14/25 #2 ml with 0 refills  2.5 mg dose    No future appointments.

## 2025-02-06 RX ORDER — TIRZEPATIDE 5 MG/.5ML
5 INJECTION, SOLUTION SUBCUTANEOUS WEEKLY
Qty: 2 ML | Refills: 2 | Status: SHIPPED | OUTPATIENT
Start: 2025-02-06

## 2025-03-05 ENCOUNTER — PATIENT MESSAGE (OUTPATIENT)
Facility: CLINIC | Age: 40
End: 2025-03-05

## 2025-04-08 DIAGNOSIS — E66.01 CLASS 2 SEVERE OBESITY WITH SERIOUS COMORBIDITY AND BODY MASS INDEX (BMI) OF 36.0 TO 36.9 IN ADULT, UNSPECIFIED OBESITY TYPE (HCC): ICD-10-CM

## 2025-04-08 DIAGNOSIS — E66.812 CLASS 2 SEVERE OBESITY WITH SERIOUS COMORBIDITY AND BODY MASS INDEX (BMI) OF 36.0 TO 36.9 IN ADULT, UNSPECIFIED OBESITY TYPE (HCC): ICD-10-CM

## 2025-04-08 RX ORDER — TIRZEPATIDE 5 MG/.5ML
5 INJECTION, SOLUTION SUBCUTANEOUS WEEKLY
Qty: 2 ML | Refills: 2 | Status: CANCELLED | OUTPATIENT
Start: 2025-04-08

## 2025-04-09 NOTE — TELEPHONE ENCOUNTER
Requesting   Requested Prescriptions     Pending Prescriptions Disp Refills    Tirzepatide-Weight Management (ZEPBOUND) 5 MG/0.5ML Subcutaneous Solution Auto-injector 2 mL 2     Sig: Inject 5 mg into the skin once a week.      LOV: 1/14/25 (tele)  RTC: 3-6mo  Last Relevant Labs:   Filled: 02/06/25 #2ml with 2 refills    Future Appointments   Date Time Provider Department Center   6/19/2025 10:40 AM Dionne Miller PA-C [facility] [address]

## 2025-04-11 RX ORDER — TIRZEPATIDE 7.5 MG/.5ML
7.5 INJECTION, SOLUTION SUBCUTANEOUS WEEKLY
Qty: 2 ML | Refills: 0 | Status: SHIPPED | OUTPATIENT
Start: 2025-04-11

## 2025-05-06 ENCOUNTER — PATIENT MESSAGE (OUTPATIENT)
Facility: CLINIC | Age: 40
End: 2025-05-06

## 2025-05-06 DIAGNOSIS — E66.01 CLASS 2 SEVERE OBESITY WITH SERIOUS COMORBIDITY AND BODY MASS INDEX (BMI) OF 36.0 TO 36.9 IN ADULT, UNSPECIFIED OBESITY TYPE (HCC): Primary | ICD-10-CM

## 2025-05-06 DIAGNOSIS — E66.812 CLASS 2 SEVERE OBESITY WITH SERIOUS COMORBIDITY AND BODY MASS INDEX (BMI) OF 36.0 TO 36.9 IN ADULT, UNSPECIFIED OBESITY TYPE (HCC): Primary | ICD-10-CM

## 2025-05-06 NOTE — TELEPHONE ENCOUNTER
Requesting zepbound increase  LOV: 1/14/25  RTC: 4 months  Last Relevant Labs: na  Filled: 4/11/25 #2ml with 0 refills  7.5 mg dose    Future Appointments   Date Time Provider Department Center   6/19/2025 10:40 AM Dionne Miller PA-C [facility] [address]

## 2025-05-08 RX ORDER — TIRZEPATIDE 10 MG/.5ML
10 INJECTION, SOLUTION SUBCUTANEOUS WEEKLY
Qty: 2 ML | Refills: 1 | Status: SHIPPED | OUTPATIENT
Start: 2025-05-08

## 2025-06-06 DIAGNOSIS — E66.01 CLASS 2 SEVERE OBESITY WITH SERIOUS COMORBIDITY AND BODY MASS INDEX (BMI) OF 36.0 TO 36.9 IN ADULT, UNSPECIFIED OBESITY TYPE (HCC): ICD-10-CM

## 2025-06-06 DIAGNOSIS — E66.812 CLASS 2 SEVERE OBESITY WITH SERIOUS COMORBIDITY AND BODY MASS INDEX (BMI) OF 36.0 TO 36.9 IN ADULT, UNSPECIFIED OBESITY TYPE (HCC): ICD-10-CM

## 2025-06-06 NOTE — TELEPHONE ENCOUNTER
Requesting   Requested Prescriptions     Pending Prescriptions Disp Refills    Tirzepatide-Weight Management (ZEPBOUND) 10 MG/0.5ML Subcutaneous Solution Auto-injector 2 mL 1     Sig: Inject 10 mg into the skin once a week.      LOV: 1/14/25 (tele)  RTC: 3-6mo  Last Relevant Labs:   Filled: 05/08/25 #2ml with 1 refills    Future Appointments   Date Time Provider Department Center   9/3/2025 10:40 AM Dionne Miller PA-C [facility] [address]

## 2025-07-03 ENCOUNTER — PATIENT MESSAGE (OUTPATIENT)
Facility: CLINIC | Age: 40
End: 2025-07-03

## 2025-07-03 NOTE — TELEPHONE ENCOUNTER
Requesting   Requested Prescriptions     Pending Prescriptions Disp Refills    Tirzepatide-Weight Management (ZEPBOUND) 12.5 MG/0.5ML Subcutaneous Solution Auto-injector 2 mL 0     Sig: Inject 12.5 mg into the skin once a week for 4 doses.      LOV: 1/14/25 (tele)  RTC: 3-6mo  Last Relevant Labs:   Filled: 05/08/25 #2ml with 1 refills    Future Appointments   Date Time Provider Department Center   9/3/2025 10:40 AM Dionne Miller PA-C [facility] [address]

## 2025-07-06 RX ORDER — TIRZEPATIDE 12.5 MG/.5ML
12.5 INJECTION, SOLUTION SUBCUTANEOUS WEEKLY
Qty: 2 ML | Refills: 0 | Status: SHIPPED | OUTPATIENT
Start: 2025-07-06 | End: 2025-07-28

## 2025-07-28 RX ORDER — TIRZEPATIDE 10 MG/.5ML
10 INJECTION, SOLUTION SUBCUTANEOUS WEEKLY
Qty: 2 ML | Refills: 1 | OUTPATIENT
Start: 2025-07-28

## 2025-07-31 ENCOUNTER — PATIENT MESSAGE (OUTPATIENT)
Facility: CLINIC | Age: 40
End: 2025-07-31

## 2025-07-31 DIAGNOSIS — E66.01 CLASS 2 SEVERE OBESITY WITH SERIOUS COMORBIDITY AND BODY MASS INDEX (BMI) OF 36.0 TO 36.9 IN ADULT, UNSPECIFIED OBESITY TYPE (HCC): Primary | ICD-10-CM

## 2025-07-31 DIAGNOSIS — Z51.81 ENCOUNTER FOR THERAPEUTIC DRUG LEVEL MONITORING: ICD-10-CM

## 2025-07-31 DIAGNOSIS — E66.812 CLASS 2 SEVERE OBESITY WITH SERIOUS COMORBIDITY AND BODY MASS INDEX (BMI) OF 36.0 TO 36.9 IN ADULT, UNSPECIFIED OBESITY TYPE (HCC): Primary | ICD-10-CM

## 2025-08-04 RX ORDER — TIRZEPATIDE 12.5 MG/.5ML
12.5 INJECTION, SOLUTION SUBCUTANEOUS WEEKLY
Qty: 2 ML | Refills: 0 | Status: SHIPPED | OUTPATIENT
Start: 2025-08-04 | End: 2025-08-26

## 2025-08-04 RX ORDER — TIRZEPATIDE 12.5 MG/.5ML
12.5 INJECTION, SOLUTION SUBCUTANEOUS WEEKLY
Qty: 2 ML | Refills: 0 | OUTPATIENT
Start: 2025-08-04 | End: 2025-08-26

## 2025-08-27 ENCOUNTER — OFFICE VISIT (OUTPATIENT)
Facility: CLINIC | Age: 40
End: 2025-08-27

## 2025-08-27 VITALS
RESPIRATION RATE: 18 BRPM | BODY MASS INDEX: 30.4 KG/M2 | HEART RATE: 94 BPM | DIASTOLIC BLOOD PRESSURE: 70 MMHG | OXYGEN SATURATION: 99 % | WEIGHT: 161 LBS | SYSTOLIC BLOOD PRESSURE: 114 MMHG | HEIGHT: 61 IN

## 2025-08-27 DIAGNOSIS — E78.5 HYPERLIPIDEMIA, UNSPECIFIED HYPERLIPIDEMIA TYPE: ICD-10-CM

## 2025-08-27 DIAGNOSIS — E66.811 CLASS 1 OBESITY WITH BODY MASS INDEX (BMI) OF 30.0 TO 30.9 IN ADULT, UNSPECIFIED OBESITY TYPE, UNSPECIFIED WHETHER SERIOUS COMORBIDITY PRESENT: ICD-10-CM

## 2025-08-27 DIAGNOSIS — E55.9 VITAMIN D DEFICIENCY: ICD-10-CM

## 2025-08-27 DIAGNOSIS — Z51.81 ENCOUNTER FOR THERAPEUTIC DRUG LEVEL MONITORING: Primary | ICD-10-CM

## 2025-08-27 PROCEDURE — 99213 OFFICE O/P EST LOW 20 MIN: CPT | Performed by: PHYSICIAN ASSISTANT

## 2025-08-27 PROCEDURE — 3074F SYST BP LT 130 MM HG: CPT | Performed by: PHYSICIAN ASSISTANT

## 2025-08-27 PROCEDURE — 3008F BODY MASS INDEX DOCD: CPT | Performed by: PHYSICIAN ASSISTANT

## 2025-08-27 PROCEDURE — 3078F DIAST BP <80 MM HG: CPT | Performed by: PHYSICIAN ASSISTANT

## 2025-08-27 RX ORDER — TIRZEPATIDE 12.5 MG/.5ML
12.5 INJECTION, SOLUTION SUBCUTANEOUS WEEKLY
COMMUNITY
Start: 2025-08-04 | End: 2025-08-27

## 2025-08-27 RX ORDER — TIRZEPATIDE 12.5 MG/.5ML
12.5 INJECTION, SOLUTION SUBCUTANEOUS WEEKLY
Qty: 2 ML | Refills: 2 | Status: SHIPPED | OUTPATIENT
Start: 2025-08-27